# Patient Record
Sex: FEMALE | Race: WHITE | NOT HISPANIC OR LATINO
[De-identification: names, ages, dates, MRNs, and addresses within clinical notes are randomized per-mention and may not be internally consistent; named-entity substitution may affect disease eponyms.]

---
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
## 2021-01-11 ENCOUNTER — NON-APPOINTMENT (OUTPATIENT)
Age: 21
End: 2021-01-11

## 2021-01-11 PROBLEM — J34.2 DEVIATED NASAL SEPTUM: Chronic | Status: ACTIVE | Noted: 2018-05-19

## 2021-01-12 ENCOUNTER — APPOINTMENT (OUTPATIENT)
Dept: OTOLARYNGOLOGY | Facility: CLINIC | Age: 21
End: 2021-01-12
Payer: COMMERCIAL

## 2021-01-12 VITALS
SYSTOLIC BLOOD PRESSURE: 120 MMHG | HEART RATE: 93 BPM | WEIGHT: 110 LBS | DIASTOLIC BLOOD PRESSURE: 82 MMHG | TEMPERATURE: 98 F | BODY MASS INDEX: 20.24 KG/M2 | HEIGHT: 62 IN

## 2021-01-12 DIAGNOSIS — R07.0 PAIN IN THROAT: ICD-10-CM

## 2021-01-12 DIAGNOSIS — J35.1 HYPERTROPHY OF TONSILS: ICD-10-CM

## 2021-01-12 DIAGNOSIS — J36 PERITONSILLAR ABSCESS: ICD-10-CM

## 2021-01-12 PROCEDURE — 99214 OFFICE O/P EST MOD 30 MIN: CPT

## 2021-01-12 PROCEDURE — 99072 ADDL SUPL MATRL&STAF TM PHE: CPT

## 2021-01-12 RX ORDER — AMOXICILLIN 875 MG/1
875 TABLET, FILM COATED ORAL
Qty: 14 | Refills: 0 | Status: ACTIVE | COMMUNITY
Start: 2021-01-12 | End: 1900-01-01

## 2021-01-12 NOTE — ASSESSMENT
[FreeTextEntry1] : Patient is now a senior at [employer] and is overall doing well since her rhinopalsty/eptoplasty in march 2018. She is very happy with the appearance. She returns to the office today with a week of throat pain and swelling. She saw a doctor at [facility] and strep swab was negative. SHe was not treated with anything. She continues to have minor pain along the left side of the throat. On examination she has erythema of the left tonsil and tonsillar pole, appears to be a partially resolving peritonsillar cellulitis or persistent bacterial infection. Put her on amoxicillin and explained the diagnosis in detail. Questions regarding her throat and tonsils were answered. This is not a recurrent issue for her as of now. \par \par photos were taken today to document 3 years postop rhinoplasty

## 2021-01-12 NOTE — PHYSICAL EXAM
[Nasal Endoscopy Performed] : nasal endoscopy was performed, see procedure section for findings [Midline] : trachea located in midline position [Normal] : no rashes [de-identified] : inflammation of the tonsil pole on the left side; minor erythema

## 2021-01-12 NOTE — HISTORY OF PRESENT ILLNESS
[de-identified] : Patient states that her tonsils swelled up about a week ago and went to [facility] for a strep swab that was negative. This is the first time since the lsat visit in 2018 with us that she has had this. SHe does not have any nasal congestion or runny nose or postnasal drip. She does not have any pain right now, no issues swallowing eating or drinking. She is not having any issues with sinus infections or breathing through the nose.

## 2021-03-23 NOTE — H&P PST ADULT - VISION (WITH CORRECTIVE LENSES IF THE PATIENT USUALLY WEARS THEM):
Handout given for HEP.  Discussed eval, POC and HEP with the patient.   Normal vision: sees adequately in most situations; can see medication labels, newsprint

## 2021-08-10 NOTE — H&P PST ADULT - NS PRO REGISTERED ORGAN DONOR
Additional Notes: Schedule excision with Dr. Wells
Render Risk Assessment In Note?: no
Detail Level: Simple
No

## 2023-11-02 ENCOUNTER — APPOINTMENT (OUTPATIENT)
Dept: OBGYN | Facility: CLINIC | Age: 23
End: 2023-11-02

## 2023-11-13 ENCOUNTER — APPOINTMENT (OUTPATIENT)
Dept: OBGYN | Facility: CLINIC | Age: 23
End: 2023-11-13
Payer: COMMERCIAL

## 2023-11-13 PROCEDURE — 99395 PREV VISIT EST AGE 18-39: CPT

## 2025-01-09 ENCOUNTER — APPOINTMENT (OUTPATIENT)
Dept: OBGYN | Facility: CLINIC | Age: 25
End: 2025-01-09
Payer: COMMERCIAL

## 2025-01-09 PROCEDURE — 99459 PELVIC EXAMINATION: CPT

## 2025-01-09 PROCEDURE — 96127 BRIEF EMOTIONAL/BEHAV ASSMT: CPT

## 2025-01-09 PROCEDURE — 99395 PREV VISIT EST AGE 18-39: CPT
